# Patient Record
Sex: FEMALE | Race: WHITE | Employment: UNEMPLOYED | URBAN - METROPOLITAN AREA
[De-identification: names, ages, dates, MRNs, and addresses within clinical notes are randomized per-mention and may not be internally consistent; named-entity substitution may affect disease eponyms.]

---

## 2018-06-24 ENCOUNTER — APPOINTMENT (OUTPATIENT)
Dept: GENERAL RADIOLOGY | Age: 23
End: 2018-06-24
Payer: COMMERCIAL

## 2018-06-24 ENCOUNTER — HOSPITAL ENCOUNTER (EMERGENCY)
Age: 23
Discharge: HOME | End: 2018-06-24
Payer: COMMERCIAL

## 2018-06-24 VITALS
TEMPERATURE: 98.6 F | SYSTOLIC BLOOD PRESSURE: 112 MMHG | HEART RATE: 84 BPM | BODY MASS INDEX: 31.39 KG/M2 | WEIGHT: 200 LBS | RESPIRATION RATE: 16 BRPM | HEIGHT: 67 IN | DIASTOLIC BLOOD PRESSURE: 72 MMHG | OXYGEN SATURATION: 98 %

## 2018-06-24 DIAGNOSIS — S93.401A SPRAIN OF RIGHT ANKLE, UNSPECIFIED LIGAMENT, INITIAL ENCOUNTER: ICD-10-CM

## 2018-06-24 DIAGNOSIS — S90.31XA CONTUSION OF RIGHT FOOT, INITIAL ENCOUNTER: ICD-10-CM

## 2018-06-24 DIAGNOSIS — S92.512A CLOSED DISPLACED FRACTURE OF PROXIMAL PHALANX OF LESSER TOE OF LEFT FOOT, INITIAL ENCOUNTER: Primary | ICD-10-CM

## 2018-06-24 PROCEDURE — 73630 X-RAY EXAM OF FOOT: CPT

## 2018-06-24 PROCEDURE — 73610 X-RAY EXAM OF ANKLE: CPT

## 2018-06-24 PROCEDURE — 6370000000 HC RX 637 (ALT 250 FOR IP): Performed by: NURSE PRACTITIONER

## 2018-06-24 PROCEDURE — 73660 X-RAY EXAM OF TOE(S): CPT

## 2018-06-24 PROCEDURE — 28515 TREATMENT OF TOE FRACTURE: CPT

## 2018-06-24 PROCEDURE — 99284 EMERGENCY DEPT VISIT MOD MDM: CPT

## 2018-06-24 PROCEDURE — 2500000003 HC RX 250 WO HCPCS: Performed by: NURSE PRACTITIONER

## 2018-06-24 RX ORDER — HYDROCODONE BITARTRATE AND ACETAMINOPHEN 5; 325 MG/1; MG/1
1 TABLET ORAL EVERY 6 HOURS PRN
Qty: 12 TABLET | Refills: 0 | Status: SHIPPED | OUTPATIENT
Start: 2018-06-24 | End: 2018-06-27

## 2018-06-24 RX ORDER — BUPIVACAINE HYDROCHLORIDE 2.5 MG/ML
10 INJECTION, SOLUTION EPIDURAL; INFILTRATION; INTRACAUDAL ONCE
Status: COMPLETED | OUTPATIENT
Start: 2018-06-24 | End: 2018-06-24

## 2018-06-24 RX ORDER — HYDROCODONE BITARTRATE AND ACETAMINOPHEN 5; 325 MG/1; MG/1
1 TABLET ORAL ONCE
Status: COMPLETED | OUTPATIENT
Start: 2018-06-24 | End: 2018-06-24

## 2018-06-24 RX ADMIN — BUPIVACAINE HYDROCHLORIDE 25 MG: 2.5 INJECTION, SOLUTION EPIDURAL; INFILTRATION; INTRACAUDAL at 15:54

## 2018-06-24 RX ADMIN — HYDROCODONE BITARTRATE AND ACETAMINOPHEN 1 TABLET: 5; 325 TABLET ORAL at 15:01

## 2018-06-24 ASSESSMENT — PAIN SCALES - GENERAL
PAINLEVEL_OUTOF10: 9
PAINLEVEL_OUTOF10: 9

## 2018-06-24 ASSESSMENT — PAIN DESCRIPTION - LOCATION: LOCATION: FOOT

## 2018-06-24 ASSESSMENT — PAIN DESCRIPTION - ORIENTATION: ORIENTATION: RIGHT;LEFT

## 2018-06-24 ASSESSMENT — PAIN DESCRIPTION - DESCRIPTORS: DESCRIPTORS: THROBBING;ACHING

## 2018-06-24 ASSESSMENT — PAIN DESCRIPTION - PAIN TYPE: TYPE: ACUTE PAIN

## 2018-06-24 ASSESSMENT — PAIN DESCRIPTION - FREQUENCY: FREQUENCY: CONTINUOUS

## 2018-06-24 ASSESSMENT — PAIN DESCRIPTION - PROGRESSION: CLINICAL_PROGRESSION: GRADUALLY WORSENING

## 2018-06-24 NOTE — ED NOTES
Left foot, toes 2-4 hermilo tapped together, post op shoe applied to left foot. CMP's intact before and after application. Right foot air cast and post op shoe applied, CMP's intact before and after application.        Katherine Obrien RN  06/24/18 8002

## 2018-06-24 NOTE — ED NOTES
Discharge instructions reviewed with pt including diagnosis, new medication, follow up appointments, and S/S of when to return.   Pt verbalized understanding      Brittany Hernandez RN  06/24/18 1687

## 2018-06-24 NOTE — ED PROVIDER NOTES
[facility]       Department of Emergency Medicine   ED  Provider Note  Admit Date/Time: 6/24/2018  2:20 PM  ED Bed: 15/15  MRN: [id number]  Chief Complaint: Foot Pain (Bilateral foot pain starting yesterday. Pt fell yesterday )       History of Present Illness   Source of history provided by:  patient and spouse/SO. History/Exam Limitations: none. Paula Schulz is a 26 y.o. female who has a past medical history of: History reviewed. No pertinent past medical history. presents to the ED by private car and is accompanied by spouse for bilateral foot and ankle pain, beginning 1 day  ago and are gradually worsening since that time. The complaint has been persistent, moderate in severity. She states she slipped on a wet step and bent the toes back on her left foot and twisted her right foot/ankle. She states she tried to get up and slipped again with the same results. She states that she has tried motrin with no relief from pain. She is unable to bear weight. Tetanus within last 5 years. Denies other injuries. ROS    Pertinent positives and negatives are stated within HPI, all other systems reviewed and are negative. Past Surgical History:   Procedure Laterality Date    TONSILLECTOMY AND ADENOIDECTOMY     Social History:  reports that she has been smoking. She has been smoking about 0.25 packs per day. She has never used smokeless tobacco. She reports that she does not drink alcohol or use drugs. Family History: family history is not on file. Allergies: Patient has no known allergies.     Physical Exam   Oxygen Saturation Interpretation: Normal.   ED Triage Vitals   BP Temp Temp Source Pulse Resp SpO2 Height Weight   06/24/18 1426 06/24/18 1426 06/24/18 1426 06/24/18 1426 06/24/18 1426 06/24/18 1426 06/24/18 1430 06/24/18 1430   128/72 98.6 °F (37 °C) Oral 71 16 100 % 5' 7\" (1.702 m) 200 lb (90.7 kg)       Physical Exam     · Constitutional/General: Alert and oriented x3, well appearing, non toxic  · HEENT:  NC/NT. PERRLA,  Airway patent. · Neck: Supple, full ROM, non tender to palpation in the midline, no stridor, no crepitus, no meningeal signs  · Respiratory: Lungs clear to auscultation bilaterally, no wheezes, rales, or rhonchi. Not in respiratory distress  · CV:  Regular rate. Regular rhythm. No murmurs, gallops, or rubs. 2+ distal pulses  · Chest: No chest wall tenderness  · GI:  Abdomen Soft, Non tender, Non distended. +BS. No rebound, guarding, or rigidity. No pulsatile masses. · Musculoskeletal: Right lateral ankle and lateral  with mild edema, tenderness and some ecchymosis. Left ankle with minimal lateral tenderness. Left foot with moderate tenderness, ecchymosis and edema at the base of toes 2 ,3, and 4. Small abrasion to medial aspect of the fifth digit. Mild deformity to 4th digit of left toe. MSPs intact bilaterally. Knees WNL bilaterally. Gait is with a limp Capillary refill <3 seconds  · Integument: skin warm and dry. No rashes unless otherwise noted. .   · Lymphatic: no lymphadenopathy noted  · Neurologic: GCS 15, no focal deficits, symmetric strength 5/5 in the upper and lower extremities bilaterally  · Psychiatric: Normal Affect    Lab / Imaging Results   (All laboratory and radiology results have been personally reviewed by myself)  Labs:  No results found for this visit on 06/24/18. Imaging: All Radiology results interpreted by Radiologist unless otherwise noted. XR TOE LEFT (MIN 2 VIEWS)   Final Result   Post reduction with improved alignment fourth proximal phalanx   fracture. XR FOOT LEFT (MIN 3 VIEWS)   Final Result   Fracture involving the base of the proximal phalanx of the fourth   digit. XR ANKLE RIGHT (MIN 3 VIEWS)   Final Result   No fracture or dislocation. XR ANKLE LEFT (MIN 3 VIEWS)   Final Result   No fracture or dislocation. XR FOOT RIGHT (MIN 3 VIEWS)   Final Result   No fracture or dislocation. ED Course / Medical Decision Making     Medications   HYDROcodone-acetaminophen (NORCO) 5-325 MG per tablet 1 tablet (1 tablet Oral Given 6/24/18 1501)   bupivacaine (PF) (MARCAINE) 0.25 % injection 25 mg (25 mg Intradermal Given 6/24/18 1554)     ED Course as of Jun 26 2209   Sun Jun 24, 2018   1547 Digital block initiated. Verbal consent obtained after discussing risks and benefits. [JG]   1634 Attempted reduction of left 4th toe after obtaining verbal consent . Risks and benefits were discussed. [JG]      ED Course User Index  [JG] KARLA Johnson CNP         Consult(s):   None    Procedure(s):     PROCEDURE NOTE    6/24/18       Time: 6526    JOINT  REDUCTION  PROCEDURE  Risks, benefits and alternatives (for applicable procedures below) described. Performed By: KARLA Yang CNP. Indication:   fracture  Informed consent: The patient provided verbal consent for this procedure. .  Location:   left  4th toe PIP joint  Procedure:  Anesthsetic/anesthsia was obtained using a digital block of the left fourth toe using 0.5% Bupivacaine without epinephrine. Attempted reduction was performed by direct traction. Patient tolerated the procedure well. Post-reduction XR's:  were obtained and revealed partial but satisfactory reduction. Orthopaedic Consultation:  No.           MDM:   Patient presents with bilateral foot and ankle pain after a fall. No other injuries. No anticoagulation. Difficulty bearing weight more difficult to put weight on left. Imaging reveals closed displaced fracture of the fourth digit. Digital block placed and reduction of the fracture completed. Some improvement of alignment is achieved. Plan is for podiatry follow up, immobilization and prescribed pain medication. Patient is to return for new or worsening symptoms. Counseling:     The emergency provider has spoken with the patient and spouse/SO and discussed todays results, in addition to providing specific details for the plan of care and counseling regarding the diagnosis and prognosis. Questions are answered at this time and they are agreeable with the plan. Assessment      1. Closed displaced fracture of proximal phalanx of lesser toe of left foot, initial encounter    2. Contusion of right foot, initial encounter    3. Sprain of right ankle, unspecified ligament, initial encounter      Plan   Discharge to home  Patient condition is stable    New Medications     Discharge Medication List as of 6/24/2018  5:04 PM      START taking these medications    Details   HYDROcodone-acetaminophen (NORCO) 5-325 MG per tablet Take 1 tablet by mouth every 6 hours as needed for Pain for up to 3 days. ., Disp-12 tablet, R-0Print           Electronically signed by KALRA Ferrell CNP   DD: 6/24/18  **This report was transcribed using voice recognition software. Every effort was made to ensure accuracy; however, inadvertent computerized transcription errors may be present.   END OF ED PROVIDER NOTE         KARLA Alejandra CNP  06/26/18 9806

## 2021-06-21 ENCOUNTER — HOSPITAL ENCOUNTER (EMERGENCY)
Age: 26
Discharge: HOME | End: 2021-06-22
Attending: EMERGENCY MEDICINE
Payer: MEDICAID

## 2021-06-21 DIAGNOSIS — J01.00 ACUTE MAXILLARY SINUSITIS, RECURRENCE NOT SPECIFIED: ICD-10-CM

## 2021-06-21 DIAGNOSIS — R51.9 SINUS HEADACHE: Primary | ICD-10-CM

## 2021-06-21 PROCEDURE — 96375 TX/PRO/DX INJ NEW DRUG ADDON: CPT

## 2021-06-21 PROCEDURE — 84703 CHORIONIC GONADOTROPIN ASSAY: CPT

## 2021-06-21 PROCEDURE — 2580000003 HC RX 258: Performed by: EMERGENCY MEDICINE

## 2021-06-21 PROCEDURE — 6360000002 HC RX W HCPCS: Performed by: EMERGENCY MEDICINE

## 2021-06-21 PROCEDURE — 36415 COLL VENOUS BLD VENIPUNCTURE: CPT

## 2021-06-21 PROCEDURE — 99283 EMERGENCY DEPT VISIT LOW MDM: CPT

## 2021-06-21 PROCEDURE — 96374 THER/PROPH/DIAG INJ IV PUSH: CPT

## 2021-06-21 RX ORDER — 0.9 % SODIUM CHLORIDE 0.9 %
2000 INTRAVENOUS SOLUTION INTRAVENOUS ONCE
Status: COMPLETED | OUTPATIENT
Start: 2021-06-21 | End: 2021-06-22

## 2021-06-21 RX ORDER — LORAZEPAM 2 MG/ML
1 INJECTION INTRAMUSCULAR ONCE
Status: COMPLETED | OUTPATIENT
Start: 2021-06-21 | End: 2021-06-21

## 2021-06-21 RX ORDER — METOCLOPRAMIDE HYDROCHLORIDE 5 MG/ML
10 INJECTION INTRAMUSCULAR; INTRAVENOUS ONCE
Status: COMPLETED | OUTPATIENT
Start: 2021-06-21 | End: 2021-06-21

## 2021-06-21 RX ORDER — DIPHENHYDRAMINE HYDROCHLORIDE 50 MG/ML
50 INJECTION INTRAMUSCULAR; INTRAVENOUS ONCE
Status: COMPLETED | OUTPATIENT
Start: 2021-06-21 | End: 2021-06-21

## 2021-06-21 RX ADMIN — METOCLOPRAMIDE HYDROCHLORIDE 10 MG: 5 INJECTION INTRAMUSCULAR; INTRAVENOUS at 23:58

## 2021-06-21 RX ADMIN — SODIUM CHLORIDE 2000 ML: 9 INJECTION, SOLUTION INTRAVENOUS at 23:51

## 2021-06-21 RX ADMIN — DIPHENHYDRAMINE HYDROCHLORIDE 50 MG: 50 INJECTION INTRAMUSCULAR; INTRAVENOUS at 23:58

## 2021-06-21 RX ADMIN — LORAZEPAM 1 MG: 2 INJECTION INTRAMUSCULAR; INTRAVENOUS at 23:58

## 2021-06-21 ASSESSMENT — PAIN DESCRIPTION - PAIN TYPE: TYPE: ACUTE PAIN

## 2021-06-21 ASSESSMENT — PAIN DESCRIPTION - ONSET: ONSET: GRADUAL

## 2021-06-21 ASSESSMENT — PAIN DESCRIPTION - ORIENTATION: ORIENTATION: RIGHT

## 2021-06-21 ASSESSMENT — PAIN DESCRIPTION - DESCRIPTORS: DESCRIPTORS: PRESSURE;THROBBING

## 2021-06-21 ASSESSMENT — PAIN DESCRIPTION - PROGRESSION: CLINICAL_PROGRESSION: GRADUALLY WORSENING

## 2021-06-21 ASSESSMENT — PAIN SCALES - WONG BAKER: WONGBAKER_NUMERICALRESPONSE: 0

## 2021-06-21 ASSESSMENT — PAIN DESCRIPTION - LOCATION: LOCATION: HEAD

## 2021-06-21 ASSESSMENT — PAIN SCALES - GENERAL: PAINLEVEL_OUTOF10: 8

## 2021-06-21 ASSESSMENT — PAIN DESCRIPTION - FREQUENCY: FREQUENCY: INTERMITTENT

## 2021-06-22 ENCOUNTER — APPOINTMENT (OUTPATIENT)
Dept: CT IMAGING | Age: 26
End: 2021-06-22
Payer: MEDICAID

## 2021-06-22 VITALS
DIASTOLIC BLOOD PRESSURE: 79 MMHG | TEMPERATURE: 98.4 F | HEART RATE: 81 BPM | SYSTOLIC BLOOD PRESSURE: 141 MMHG | WEIGHT: 210 LBS | HEIGHT: 65 IN | BODY MASS INDEX: 34.99 KG/M2 | RESPIRATION RATE: 16 BRPM | OXYGEN SATURATION: 100 %

## 2021-06-22 LAB — HCG QUALITATIVE: NEGATIVE

## 2021-06-22 PROCEDURE — 6360000002 HC RX W HCPCS: Performed by: EMERGENCY MEDICINE

## 2021-06-22 PROCEDURE — 96375 TX/PRO/DX INJ NEW DRUG ADDON: CPT

## 2021-06-22 PROCEDURE — 70450 CT HEAD/BRAIN W/O DYE: CPT

## 2021-06-22 PROCEDURE — 2580000003 HC RX 258: Performed by: EMERGENCY MEDICINE

## 2021-06-22 RX ORDER — LORATADINE AND PSEUDOEPHEDRINE SULFATE 5; 120 MG/1; MG/1
1 TABLET, EXTENDED RELEASE ORAL 2 TIMES DAILY
Qty: 20 TABLET | Refills: 0 | Status: SHIPPED | OUTPATIENT
Start: 2021-06-22 | End: 2022-02-09 | Stop reason: ALTCHOICE

## 2021-06-22 RX ORDER — ONDANSETRON 4 MG/1
4 TABLET, ORALLY DISINTEGRATING ORAL EVERY 8 HOURS PRN
Qty: 20 TABLET | Refills: 0 | Status: SHIPPED | OUTPATIENT
Start: 2021-06-22

## 2021-06-22 RX ORDER — DOXYCYCLINE HYCLATE 100 MG
100 TABLET ORAL 2 TIMES DAILY
Qty: 20 TABLET | Refills: 0 | Status: SHIPPED | OUTPATIENT
Start: 2021-06-22 | End: 2021-07-02

## 2021-06-22 RX ORDER — BUTALBITAL, ACETAMINOPHEN AND CAFFEINE 300; 40; 50 MG/1; MG/1; MG/1
1 CAPSULE ORAL EVERY 4 HOURS PRN
Qty: 20 CAPSULE | Refills: 0 | Status: SHIPPED | OUTPATIENT
Start: 2021-06-22

## 2021-06-22 RX ADMIN — CEFTRIAXONE 1000 MG: 1 INJECTION, POWDER, FOR SOLUTION INTRAMUSCULAR; INTRAVENOUS at 01:07

## 2021-06-22 NOTE — ED PROVIDER NOTES
HPI:  6/22/21, Time: 12:40 AM EDT        David Andino is a 27 y.o. female presenting to the ED for sinus congestion, headache plus nausea w/ vomiting, beginning 4 days ago. The complaint has been persistent, severe in severity, and worsened by vomiting. Patient has not had any fever/chills associated. She states she does smoke cigarettes. She has had some nosebleeds occasionally after vomiting. She is not had any focal extremity weakness, no slurred speech no vision loss, no neck stiffness but she has had some right, lateral neck pain, no rashes. Patient denies any change in bowel bladder habit patterns reported. No other complaints are reported. She rates her headache at 8/10 severity. Review of Systems:   A complete review of systems was performed and pertinent positives and negatives are stated within HPI, all other systems reviewed and are negative.    --------------------------------------------- PAST HISTORY ---------------------------------------------  Past Medical History:  has no past medical history on file. Past Surgical History:  has a past surgical history that includes Tonsillectomy and adenoidectomy. Social History:  reports that she has been smoking cigarettes. She has been smoking about 0.25 packs per day. She has never used smokeless tobacco. She reports that she does not drink alcohol and does not use drugs. Family History: family history is not on file. The patients home medications have been reviewed. Allergies: Patient has no known allergies.     -------------------------------------------------- RESULTS -------------------------------------------------  All laboratory and radiology results have been personally reviewed by myself   LABS:  Results for orders placed or performed during the hospital encounter of 06/21/21   HCG Qualitative, Serum   Result Value Ref Range    hCG Qual NEGATIVE NEGATIVE       RADIOLOGY:  Interpreted by Radiologist.  [address] Final Result   No acute intracranial abnormality. No acute territorial infarction,   intracranial hemorrhage or mass lesion. Moderate mucosal thickening of the ethmoidal air cells and maxillary sinuses   and left sphenoid sinus. Mild mucosal thickening of right sphenoid sinus.             ------------------------- NURSING NOTES AND VITALS REVIEWED ---------------------------    The nursing notes within the ED encounter and vital signs as below have been reviewed. BP (!) 164/84   Pulse 85   Temp 98.4 °F (36.9 °C) (Oral)   Resp 16   Ht 5' 5\" (1.651 m)   Wt 210 lb (95.3 kg)   LMP 06/08/2021 (Exact Date)   SpO2 99%   Breastfeeding Unknown   BMI 34.95 kg/m²   Oxygen Saturation Interpretation: Normal      ---------------------------------------------------PHYSICAL EXAM--------------------------------------      Constitutional/General: Alert and oriented x3, well appearing, non toxic in moderate distress  Head: Normocephalic and atraumatic  Eyes: PERRL, EOMI   Ears: Grossly normal with no evidence of any trauma. Mouth: Oropharynx clear, handling secretions, no trismus  Neck: Supple, full ROM, no nuchal rigidity, no JVD. Trachea midline no lymphadenopathy  Pulmonary: Lungs clear to auscultation bilaterally, no wheezes, rales, or rhonchi. Not in respiratory distress  Cardiovascular:  Regular rate and rhythm, no murmurs, gallops, or rubs. 2+ distal pulses  GI:  Abdomen Soft, non tender, non distended, no organomegaly no masses no guarding no rigidity. Normal active bowel sounds  Extremities: Moves all extremities x 4. Warm and well perfused  Skin: warm and dry without rash; cervically no petechia no purpura no target lesions no bullae  Neurologic: GCS 15, cranial nerves II through XII intact no focal deficits.   No meningeal signs  Psych: Normal Affect      ------------------------------ ED COURSE/MEDICAL DECISION MAKING----------------------  Medications   cefTRIAXone (ROCEPHIN) 1,000 mg in sterile water 10 mL IV syringe (has no administration in time range)   diphenhydrAMINE (BENADRYL) injection 50 mg (50 mg Intravenous Given 6/21/21 2358)   metoclopramide (REGLAN) injection 10 mg (10 mg Intravenous Given 6/21/21 2358)   LORazepam (ATIVAN) injection 1 mg (1 mg Intravenous Given 6/21/21 2358)   0.9 % sodium chloride bolus (2,000 mLs Intravenous New Bag 6/21/21 2351)         ED COURSE:     Medical Decision Making:   Differential diagnoses:  Sinusitis/sinus headache, common migraine headache, classic migraine headache, muscular tension headache, SAH, meningitis, to name a few. Patient does not have any photophobia no nuchal rigidity nor febrile course to suggest meningitis. No meningeal signs at all on clinical exam.  Her headache is not described as worst headache of her life although severe in a CT head study showed no acute process. No further studies are felt to be warranted based upon clinical history. Patient is felt to have likely sinusitis with sinus headache plus possible contributing muscular tension headache. She is given Rocephin 1 g IV here in the department for treatment of suspected sinusitis and the patient is also given appropriate analgesic meds for her headache discharged home. Patient was also counseled regarding quitting smoking. Counseling: The emergency provider has spoken with the patient and spouse/SO and discussed todays results, in addition to providing specific details for the plan of care and counseling regarding the diagnosis and prognosis. Questions are answered at this time and they are agreeable with the plan.    --------------------------------- IMPRESSION AND DISPOSITION ---------------------------------    IMPRESSION  1. Sinus headache    2. Acute maxillary sinusitis, recurrence not specified        DISPOSITION  Disposition: Discharge to home  Patient condition is stable      NOTE: This report was transcribed using voice recognition software.  Every effort was made to ensure accuracy; however, inadvertent computerized transcription errors may be present        Steven Harry MD  06/22/21 0055

## 2022-02-08 ENCOUNTER — APPOINTMENT (OUTPATIENT)
Dept: GENERAL RADIOLOGY | Age: 27
End: 2022-02-08
Payer: MEDICAID

## 2022-02-08 ENCOUNTER — HOSPITAL ENCOUNTER (EMERGENCY)
Age: 27
Discharge: HOME | End: 2022-02-09
Attending: EMERGENCY MEDICINE
Payer: MEDICAID

## 2022-02-08 DIAGNOSIS — J45.20 MILD INTERMITTENT REACTIVE AIRWAY DISEASE WITHOUT COMPLICATION: Primary | ICD-10-CM

## 2022-02-08 PROCEDURE — 87635 SARS-COV-2 COVID-19 AMP PRB: CPT

## 2022-02-08 PROCEDURE — 71046 X-RAY EXAM CHEST 2 VIEWS: CPT

## 2022-02-08 PROCEDURE — 99284 EMERGENCY DEPT VISIT MOD MDM: CPT

## 2022-02-08 PROCEDURE — 6370000000 HC RX 637 (ALT 250 FOR IP): Performed by: EMERGENCY MEDICINE

## 2022-02-08 RX ORDER — IPRATROPIUM BROMIDE AND ALBUTEROL SULFATE 2.5; .5 MG/3ML; MG/3ML
1 SOLUTION RESPIRATORY (INHALATION) ONCE
Status: COMPLETED | OUTPATIENT
Start: 2022-02-08 | End: 2022-02-08

## 2022-02-08 RX ORDER — PREDNISONE 20 MG/1
60 TABLET ORAL ONCE
Status: COMPLETED | OUTPATIENT
Start: 2022-02-08 | End: 2022-02-08

## 2022-02-08 RX ADMIN — IPRATROPIUM BROMIDE AND ALBUTEROL SULFATE 1 AMPULE: 2.5; .5 SOLUTION RESPIRATORY (INHALATION) at 23:38

## 2022-02-08 RX ADMIN — PREDNISONE 60 MG: 20 TABLET ORAL at 23:37

## 2022-02-09 VITALS
HEART RATE: 94 BPM | RESPIRATION RATE: 20 BRPM | WEIGHT: 200 LBS | SYSTOLIC BLOOD PRESSURE: 164 MMHG | BODY MASS INDEX: 33.28 KG/M2 | TEMPERATURE: 98.3 F | DIASTOLIC BLOOD PRESSURE: 89 MMHG | OXYGEN SATURATION: 94 %

## 2022-02-09 LAB — SARS-COV-2, NAAT: NOT DETECTED

## 2022-02-09 RX ORDER — PREDNISONE 20 MG/1
20 TABLET ORAL 2 TIMES DAILY
Qty: 10 TABLET | Refills: 0 | Status: SHIPPED | OUTPATIENT
Start: 2022-02-09 | End: 2022-02-14

## 2022-02-09 RX ORDER — ALBUTEROL SULFATE 90 UG/1
2 AEROSOL, METERED RESPIRATORY (INHALATION) EVERY 4 HOURS PRN
Qty: 18 G | Refills: 0 | Status: SHIPPED | OUTPATIENT
Start: 2022-02-09

## 2022-02-09 RX ORDER — LORATADINE AND PSEUDOEPHEDRINE SULFATE 5; 120 MG/1; MG/1
1 TABLET, EXTENDED RELEASE ORAL 2 TIMES DAILY
Qty: 20 TABLET | Refills: 0 | Status: SHIPPED | OUTPATIENT
Start: 2022-02-09

## 2022-02-09 NOTE — ED PROVIDER NOTES
HPI:  2/8/22, Time: 11:20 PM APRYL Alicia is a 24 y.o. female presenting to the ED for shortness of breath, beginning 12 hrs ago. The complaint has been persistent, moderate in severity, and worsened by moderate exertion and while laying down. Patient denies any chest pain/pressure/tightness associated. Patient is not on any oral estrogen medications/birth control and she denies any fever/chills associated. Patient reportedly quit smoking 1 month ago but does have a 10-pack-year history of tobacco use. No relieving factors are reported. No other complaints; no nausea/vomiting, no diaphoresis, no palpitations, no syncope nor near syncopal episodes reported. Patient is unvaccinated against COVID-19    Review of Systems:   A complete review of systems was performed and pertinent positives and negatives are stated within HPI, all other systems reviewed and are negative.    --------------------------------------------- PAST HISTORY ---------------------------------------------  Past Medical History:  has no past medical history on file. Past Surgical History:  has a past surgical history that includes Tonsillectomy and adenoidectomy. Social History:  reports that she quit smoking about 4 weeks ago. Her smoking use included cigarettes. She started smoking about 19 years ago. She smoked 0.25 packs per day. She has never used smokeless tobacco. She reports that she does not drink alcohol and does not use drugs. Family History: family history is not on file. The patients home medications have been reviewed. Allergies: Patient has no known allergies.     -------------------------------------------------- RESULTS -------------------------------------------------  All laboratory and radiology results have been personally reviewed by myself   LABS:  Results for orders placed or performed during the hospital encounter of 02/08/22   COVID-19, Rapid    Specimen: Nasopharyngeal Swab   Result Value Ref Range    SARS-CoV-2, NAAT Not Detected Not Detected       RADIOLOGY:  Interpreted by Radiologist.  XR CHEST (2 VW)   Final Result   No acute process. ------------------------- NURSING NOTES AND VITALS REVIEWED ---------------------------    The nursing notes within the ED encounter and vital signs as below have been reviewed. BP (!) 164/89   Pulse 94   Temp 98.3 °F (36.8 °C) (Oral)   Resp 20   Wt 200 lb (90.7 kg)   LMP 01/08/2022 (Approximate)   SpO2 94%   BMI 33.28 kg/m²   Oxygen Saturation Interpretation: Normal      ---------------------------------------------------PHYSICAL EXAM--------------------------------------      Constitutional/General: Alert and oriented x3, well appearing, non toxic in mild distress  Head: Normocephalic and atraumatic  Eyes: PERRL, EOMI  Mouth: Oropharynx clear, handling secretions, no trismus  Neck: Supple, full ROM, no JVD. Trachea  Pulmonary: Lungs clear to auscultation bilaterally, no wheezes, rales, or rhonchi. Not in respiratory distress  Cardiovascular:  Regular rate and rhythm, no murmurs, gallops, or rubs. 2+ distal pulses  Abdomen: Soft, non tender, non distended, organomegaly no masses no guarding no rigidity normal bowel  Extremities: Moves all extremities x 4. Warm and well perfused  Skin: warm and dry without rash  Neurologic: GCS 15, renal nerves grossly intact no focal deficits  Psych: Normal Affect      ------------------------------ ED COURSE/MEDICAL DECISION MAKING----------------------  Medications   predniSONE (DELTASONE) tablet 60 mg (60 mg Oral Given 2/8/22 2337)   ipratropium-albuterol (DUONEB) nebulizer solution 1 ampule (1 ampule Inhalation Given 2/8/22 2338)         ED COURSE:     Medical Decision Making:   Differential diagnoses:  Asthma, reactive airway disease, pneumonia, PE, COVID-19, to name a few.   Patient did receive a DuoNeb nebulized aerosol treatment here in the department she states her symptoms are significant improved. Her COVID-19 screen came back and was negative and chest x-ray showed no evidence of cardiomegaly, no CHF, no focal infiltrates. Patient's mother is stable appropriate discharge close outpatient follow-up. Pt's Well's Score for PE = 0 points, low risk score; PERC--Negative. Counseling: The emergency provider has spoken with the patient and discussed todays results, in addition to providing specific details for the plan of care and counseling regarding the diagnosis and prognosis. Questions are answered at this time and they are agreeable with the plan.    --------------------------------- IMPRESSION AND DISPOSITION ---------------------------------    IMPRESSION  1. Mild intermittent reactive airway disease without complication        DISPOSITION  Disposition: Discharge to home  Patient condition is stable      NOTE: This report was transcribed using voice recognition software.  Every effort was made to ensure accuracy; however, inadvertent computerized transcription errors may be present        Rosamaria Padilla MD  02/09/22 8115

## 2022-02-09 NOTE — ED NOTES
Discharged   rx x 3 escribed to [facility]  [place]-   To follow with pmd     Ramon Cody RN  02/09/22 6564

## 2022-03-13 ENCOUNTER — APPOINTMENT (OUTPATIENT)
Dept: GENERAL RADIOLOGY | Age: 27
End: 2022-03-13
Payer: MEDICAID

## 2022-03-13 ENCOUNTER — HOSPITAL ENCOUNTER (EMERGENCY)
Age: 27
Discharge: HOME | End: 2022-03-13
Attending: EMERGENCY MEDICINE
Payer: MEDICAID

## 2022-03-13 VITALS
RESPIRATION RATE: 16 BRPM | TEMPERATURE: 98.5 F | OXYGEN SATURATION: 91 % | WEIGHT: 200 LBS | BODY MASS INDEX: 33.28 KG/M2 | DIASTOLIC BLOOD PRESSURE: 94 MMHG | SYSTOLIC BLOOD PRESSURE: 128 MMHG | HEART RATE: 106 BPM

## 2022-03-13 DIAGNOSIS — J20.9 ACUTE BRONCHITIS WITH WHEEZING: ICD-10-CM

## 2022-03-13 DIAGNOSIS — J45.41 MODERATE PERSISTENT REACTIVE AIRWAY DISEASE WITH ACUTE EXACERBATION: Primary | ICD-10-CM

## 2022-03-13 LAB
ALBUMIN SERPL-MCNC: 4.5 G/DL (ref 3.5–5.2)
ALP BLD-CCNC: 133 U/L (ref 35–104)
ALT SERPL-CCNC: 29 U/L (ref 0–32)
ANION GAP SERPL CALCULATED.3IONS-SCNC: 13 MMOL/L (ref 7–16)
AST SERPL-CCNC: 26 U/L (ref 0–31)
BASOPHILS ABSOLUTE: 0.09 E9/L (ref 0–0.2)
BASOPHILS RELATIVE PERCENT: 0.7 % (ref 0–2)
BILIRUB SERPL-MCNC: 0.3 MG/DL (ref 0–1.2)
BUN BLDV-MCNC: 8 MG/DL (ref 6–20)
CALCIUM SERPL-MCNC: 9.7 MG/DL (ref 8.6–10.2)
CHLORIDE BLD-SCNC: 101 MMOL/L (ref 98–107)
CO2: 23 MMOL/L (ref 22–29)
CREAT SERPL-MCNC: 0.6 MG/DL (ref 0.5–1)
EOSINOPHILS ABSOLUTE: 0.44 E9/L (ref 0.05–0.5)
EOSINOPHILS RELATIVE PERCENT: 3.4 % (ref 0–6)
GFR AFRICAN AMERICAN: >60
GFR NON-AFRICAN AMERICAN: >60 ML/MIN/1.73
GLUCOSE BLD-MCNC: 95 MG/DL (ref 74–99)
HCG, URINE, POC: NEGATIVE
HCT VFR BLD CALC: 47.5 % (ref 34–48)
HEMOGLOBIN: 15.7 G/DL (ref 11.5–15.5)
IMMATURE GRANULOCYTES #: 0.04 E9/L
IMMATURE GRANULOCYTES %: 0.3 % (ref 0–5)
INFLUENZA A BY PCR: NOT DETECTED
INFLUENZA B BY PCR: NOT DETECTED
LYMPHOCYTES ABSOLUTE: 2.18 E9/L (ref 1.5–4)
LYMPHOCYTES RELATIVE PERCENT: 16.7 % (ref 20–42)
Lab: NORMAL
MCH RBC QN AUTO: 30.4 PG (ref 26–35)
MCHC RBC AUTO-ENTMCNC: 33.1 % (ref 32–34.5)
MCV RBC AUTO: 92.1 FL (ref 80–99.9)
MONOCYTES ABSOLUTE: 0.71 E9/L (ref 0.1–0.95)
MONOCYTES RELATIVE PERCENT: 5.4 % (ref 2–12)
NEGATIVE QC PASS/FAIL: NORMAL
NEUTROPHILS ABSOLUTE: 9.58 E9/L (ref 1.8–7.3)
NEUTROPHILS RELATIVE PERCENT: 73.5 % (ref 43–80)
PDW BLD-RTO: 11.9 FL (ref 11.5–15)
PLATELET # BLD: 352 E9/L (ref 130–450)
PMV BLD AUTO: 10.6 FL (ref 7–12)
POSITIVE QC PASS/FAIL: NORMAL
POTASSIUM REFLEX MAGNESIUM: 4.6 MMOL/L (ref 3.5–5)
RBC # BLD: 5.16 E12/L (ref 3.5–5.5)
SARS-COV-2, NAAT: NOT DETECTED
SODIUM BLD-SCNC: 137 MMOL/L (ref 132–146)
TOTAL PROTEIN: 8.1 G/DL (ref 6.4–8.3)
TROPONIN, HIGH SENSITIVITY: <6 NG/L (ref 0–9)
WBC # BLD: 13 E9/L (ref 4.5–11.5)

## 2022-03-13 PROCEDURE — 87635 SARS-COV-2 COVID-19 AMP PRB: CPT

## 2022-03-13 PROCEDURE — 85025 COMPLETE CBC W/AUTO DIFF WBC: CPT

## 2022-03-13 PROCEDURE — 84484 ASSAY OF TROPONIN QUANT: CPT

## 2022-03-13 PROCEDURE — 6370000000 HC RX 637 (ALT 250 FOR IP): Performed by: STUDENT IN AN ORGANIZED HEALTH CARE EDUCATION/TRAINING PROGRAM

## 2022-03-13 PROCEDURE — 94640 AIRWAY INHALATION TREATMENT: CPT

## 2022-03-13 PROCEDURE — 87502 INFLUENZA DNA AMP PROBE: CPT

## 2022-03-13 PROCEDURE — 80053 COMPREHEN METABOLIC PANEL: CPT

## 2022-03-13 PROCEDURE — 71045 X-RAY EXAM CHEST 1 VIEW: CPT

## 2022-03-13 PROCEDURE — 94664 DEMO&/EVAL PT USE INHALER: CPT

## 2022-03-13 PROCEDURE — 36415 COLL VENOUS BLD VENIPUNCTURE: CPT

## 2022-03-13 PROCEDURE — 99285 EMERGENCY DEPT VISIT HI MDM: CPT

## 2022-03-13 PROCEDURE — 93005 ELECTROCARDIOGRAM TRACING: CPT | Performed by: PHYSICIAN ASSISTANT

## 2022-03-13 PROCEDURE — 96374 THER/PROPH/DIAG INJ IV PUSH: CPT

## 2022-03-13 PROCEDURE — 6360000002 HC RX W HCPCS: Performed by: STUDENT IN AN ORGANIZED HEALTH CARE EDUCATION/TRAINING PROGRAM

## 2022-03-13 RX ORDER — METHYLPREDNISOLONE SODIUM SUCCINATE 125 MG/2ML
125 INJECTION, POWDER, LYOPHILIZED, FOR SOLUTION INTRAMUSCULAR; INTRAVENOUS ONCE
Status: COMPLETED | OUTPATIENT
Start: 2022-03-13 | End: 2022-03-13

## 2022-03-13 RX ORDER — ALBUTEROL SULFATE 90 UG/1
2 AEROSOL, METERED RESPIRATORY (INHALATION) EVERY 6 HOURS PRN
Status: DISCONTINUED | OUTPATIENT
Start: 2022-03-13 | End: 2022-03-13 | Stop reason: CLARIF

## 2022-03-13 RX ORDER — ALBUTEROL SULFATE 90 UG/1
2 AEROSOL, METERED RESPIRATORY (INHALATION) EVERY 6 HOURS PRN
Status: DISCONTINUED | OUTPATIENT
Start: 2022-03-13 | End: 2022-03-14 | Stop reason: HOSPADM

## 2022-03-13 RX ORDER — PREDNISONE 50 MG/1
50 TABLET ORAL DAILY
Qty: 5 TABLET | Refills: 0 | Status: SHIPPED | OUTPATIENT
Start: 2022-03-13 | End: 2022-03-18

## 2022-03-13 RX ORDER — IPRATROPIUM BROMIDE AND ALBUTEROL SULFATE 2.5; .5 MG/3ML; MG/3ML
3 SOLUTION RESPIRATORY (INHALATION) ONCE
Status: COMPLETED | OUTPATIENT
Start: 2022-03-13 | End: 2022-03-13

## 2022-03-13 RX ADMIN — METHYLPREDNISOLONE SODIUM SUCCINATE 125 MG: 125 INJECTION, POWDER, FOR SOLUTION INTRAMUSCULAR; INTRAVENOUS at 22:02

## 2022-03-13 RX ADMIN — IPRATROPIUM BROMIDE AND ALBUTEROL SULFATE 3 AMPULE: .5; 3 SOLUTION RESPIRATORY (INHALATION) at 21:31

## 2022-03-13 RX ADMIN — ALBUTEROL SULFATE 2 PUFF: 90 AEROSOL, METERED RESPIRATORY (INHALATION) at 23:49

## 2022-03-13 ASSESSMENT — ENCOUNTER SYMPTOMS
SHORTNESS OF BREATH: 1
COUGH: 1
NAUSEA: 0
CHEST TIGHTNESS: 1
ABDOMINAL PAIN: 0
BACK PAIN: 1

## 2022-03-14 LAB
EKG ATRIAL RATE: 110 BPM
EKG P AXIS: 71 DEGREES
EKG P-R INTERVAL: 174 MS
EKG Q-T INTERVAL: 316 MS
EKG QRS DURATION: 86 MS
EKG QTC CALCULATION (BAZETT): 427 MS
EKG R AXIS: 67 DEGREES
EKG T AXIS: 60 DEGREES
EKG VENTRICULAR RATE: 110 BPM

## 2022-03-14 PROCEDURE — 93010 ELECTROCARDIOGRAM REPORT: CPT | Performed by: INTERNAL MEDICINE

## 2022-03-14 NOTE — ED PROVIDER NOTES
72-year-old female presenting for shortness of breath and cough. Symptoms started 3 hours prior to arrival.  They have been persistent, no exacerbating or relieving factors. Patient states that she has had a mild cough today. She states she has been seen in the ED before for this and was told she has allergies and was given Claritin. She complains of pain in her lower ribs and in her neck. She complains of a tightness in her chest.  She denies any fever, leg swelling, hemoptysis, recent surgery, leg swelling, recent long distance travel, and use of hormone based products. Review of Systems   Constitutional: Negative for chills and fever. HENT: Negative for congestion. Respiratory: Positive for cough, chest tightness and shortness of breath. Cardiovascular: Negative for chest pain and leg swelling. Gastrointestinal: Negative for abdominal pain and nausea. Musculoskeletal: Positive for back pain and neck pain. Skin: Negative for rash and wound. Neurological: Negative for dizziness and headaches. All other systems reviewed and are negative. Physical Exam  Constitutional:       Comments: Mild distress   HENT:      Head: Normocephalic and atraumatic. Eyes:      Pupils: Pupils are equal, round, and reactive to light. Cardiovascular:      Rate and Rhythm: Regular rhythm. Tachycardia present. Pulmonary:      Comments: Decreased aeration diffusely, diffuse expiratory wheezes tachypnea, conversational dyspnea  Abdominal:      Palpations: Abdomen is soft. Tenderness: There is no abdominal tenderness. Musculoskeletal:      Right lower leg: No edema. Left lower leg: No edema. Skin:     General: Skin is warm. Neurological:      General: No focal deficit present. Mental Status: She is alert.    Psychiatric:         Mood and Affect: Mood normal.         Behavior: Behavior normal.          Procedures     MDM  Number of Diagnoses or Management Options  Dyspnea, unspecified type  Diagnosis management comments: 22-year-old female presenting with shortness of breath and cough. Patient with wheezing and decreased aeration on exam, but not hypoxic. Labs and chest x-ray unremarkable. Patient was given duo nebs and Solu-Medrol with relief of symptoms. Patient is PERC negative, so low suspicion for PE at this time. Presentation consistent for exacerbation of reactive airway disease. Patient was given inhaler and prescription for prednisone. She was given referral to [facility] primary care. She was discharged home in stable condition with instructions to follow-up with PCP and to return to the ED if symptoms worsen. Amount and/or Complexity of Data Reviewed  Clinical lab tests: reviewed  Tests in the radiology section of CPT®: reviewed  Tests in the medicine section of CPT®: reviewed                      --------------------------------------------- PAST HISTORY ---------------------------------------------  Past Medical History:  has no past medical history on file. Past Surgical History:  has a past surgical history that includes Tonsillectomy and adenoidectomy. Social History:  reports that she quit smoking about 2 months ago. Her smoking use included cigarettes. She started smoking about 19 years ago. She smoked 0.25 packs per day. She has never used smokeless tobacco. She reports that she does not drink alcohol and does not use drugs. Family History: family history is not on file. The patients home medications have been reviewed. Allergies: Patient has no known allergies.     -------------------------------------------------- RESULTS -------------------------------------------------  Labs:  Results for orders placed or performed during the hospital encounter of 03/13/22   COVID-19, Rapid    Specimen: Nasopharyngeal Swab   Result Value Ref Range    SARS-CoV-2, NAAT Not Detected Not Detected   Rapid influenza A/B antigens    Specimen: Nasopharyngeal Result Value Ref Range    Influenza A by PCR Not Detected Not Detected    Influenza B by PCR Not Detected Not Detected   CBC with Auto Differential   Result Value Ref Range    WBC 13.0 (H) 4.5 - 11.5 E9/L    RBC 5.16 3.50 - 5.50 E12/L    Hemoglobin 15.7 (H) 11.5 - 15.5 g/dL    Hematocrit 47.5 34.0 - 48.0 %    MCV 92.1 80.0 - 99.9 fL    MCH 30.4 26.0 - 35.0 pg    MCHC 33.1 32.0 - 34.5 %    RDW 11.9 11.5 - 15.0 fL    Platelets 084 616 - 961 E9/L    MPV 10.6 7.0 - 12.0 fL    Neutrophils % 73.5 43.0 - 80.0 %    Immature Granulocytes % 0.3 0.0 - 5.0 %    Lymphocytes % 16.7 (L) 20.0 - 42.0 %    Monocytes % 5.4 2.0 - 12.0 %    Eosinophils % 3.4 0.0 - 6.0 %    Basophils % 0.7 0.0 - 2.0 %    Neutrophils Absolute 9.58 (H) 1.80 - 7.30 E9/L    Immature Granulocytes # 0.04 E9/L    Lymphocytes Absolute 2.18 1.50 - 4.00 E9/L    Monocytes Absolute 0.71 0.10 - 0.95 E9/L    Eosinophils Absolute 0.44 0.05 - 0.50 E9/L    Basophils Absolute 0.09 0.00 - 0.20 E9/L   Comprehensive Metabolic Panel w/ Reflex to MG   Result Value Ref Range    Sodium 137 132 - 146 mmol/L    Potassium reflex Magnesium 4.6 3.5 - 5.0 mmol/L    Chloride 101 98 - 107 mmol/L    CO2 23 22 - 29 mmol/L    Anion Gap 13 7 - 16 mmol/L    Glucose 95 74 - 99 mg/dL    BUN 8 6 - 20 mg/dL    CREATININE 0.6 0.5 - 1.0 mg/dL    GFR Non-African American >60 >=60 mL/min/1.73    GFR African American >60     Calcium 9.7 8.6 - 10.2 mg/dL    Total Protein 8.1 6.4 - 8.3 g/dL    Albumin 4.5 3.5 - 5.2 g/dL    Total Bilirubin 0.3 0.0 - 1.2 mg/dL    Alkaline Phosphatase 133 (H) 35 - 104 U/L    ALT 29 0 - 32 U/L    AST 26 0 - 31 U/L   Troponin   Result Value Ref Range    Troponin, High Sensitivity <6 0 - 9 ng/L   POC Pregnancy Urine Qual   Result Value Ref Range    HCG, Urine, POC Negative Negative    Lot Number [id number]     Positive QC Pass/Fail Pass     Negative QC Pass/Fail Pass    EKG 12 Lead   Result Value Ref Range    Ventricular Rate 110 BPM    Atrial Rate 110 BPM    P-R Interval 174 ms    QRS Duration 86 ms    Q-T Interval 316 ms    QTc Calculation (Bazett) 427 ms    P Axis 71 degrees    R Axis 67 degrees    T Axis 60 degrees       Radiology:  XR CHEST PORTABLE   Final Result        EKG: Interpreted by me  Rate: 110  Rhythm: Sinus  Interpretation: No acute ST elevations or depressions, no acute T wave changes, normal intervals  Comparison: No previous EKG available    ------------------------- NURSING NOTES AND VITALS REVIEWED ---------------------------  Date / Time Roomed:  3/13/2022  9:20 PM  ED Bed Assignment:  02/02    The nursing notes within the ED encounter and vital signs as below have been reviewed. BP (!) 128/94   Pulse 106   Temp 98.5 °F (36.9 °C) (Oral)   Resp 16   Wt 200 lb (90.7 kg)   LMP 03/08/2022   SpO2 91%   BMI 33.28 kg/m²   Oxygen Saturation Interpretation: Normal      ------------------------------------------ PROGRESS NOTES ------------------------------------------    I have spoken with the patient and discussed todays results, in addition to providing specific details for the plan of care and counseling regarding the diagnosis and prognosis. Their questions are answered at this time and they are agreeable with the plan. I discussed at length with them reasons for immediate return here for re evaluation. They will followup with their primary care physician by calling their office tomorrow. --------------------------------- ADDITIONAL PROVIDER NOTES ---------------------------------  At this time the patient is without objective evidence of an acute process requiring hospitalization or inpatient management. They have remained hemodynamically stable throughout their entire ED visit and are stable for discharge with outpatient follow-up. The plan has been discussed in detail and they are aware of the specific conditions for emergent return, as well as the importance of follow-up.       Discharge Medication List as of 3/13/2022 11:42 PM      START taking these medications    Details   predniSONE (DELTASONE) 50 MG tablet Take 1 tablet by mouth daily for 5 days, Disp-5 tablet, R-0Print             Diagnosis:  1. Dyspnea, unspecified type        Disposition:  Patient's disposition: Discharge to home  Patient's condition is stable.           Thais Robbins MD  Resident  03/14/22 [id number]

## 2023-02-26 ENCOUNTER — ANESTHESIA (OUTPATIENT)
Dept: LABOR AND DELIVERY | Age: 28
DRG: 560 | End: 2023-02-26
Payer: MEDICAID

## 2023-02-26 ENCOUNTER — HOSPITAL ENCOUNTER (INPATIENT)
Age: 28
LOS: 1 days | Discharge: HOME | DRG: 560 | End: 2023-02-27
Attending: OBSTETRICS & GYNECOLOGY | Admitting: OBSTETRICS & GYNECOLOGY
Payer: MEDICAID

## 2023-02-26 ENCOUNTER — ANESTHESIA EVENT (OUTPATIENT)
Dept: LABOR AND DELIVERY | Age: 28
DRG: 560 | End: 2023-02-26
Payer: MEDICAID

## 2023-02-26 PROBLEM — Z37.9 NORMAL LABOR: Status: ACTIVE | Noted: 2023-02-26

## 2023-02-26 LAB
ABO/RH: NORMAL
ALBUMIN SERPL-MCNC: 3.2 G/DL (ref 3.5–5.2)
ALP BLD-CCNC: 269 U/L (ref 35–104)
ALT SERPL-CCNC: 28 U/L (ref 0–32)
AMNISURE, POC: POSITIVE
AMPHETAMINE SCREEN, URINE: NOT DETECTED
ANION GAP SERPL CALCULATED.3IONS-SCNC: 10 MMOL/L (ref 7–16)
ANTIBODY SCREEN: NORMAL
AST SERPL-CCNC: 30 U/L (ref 0–31)
BARBITURATE SCREEN URINE: NOT DETECTED
BASOPHILS ABSOLUTE: 0.08 E9/L (ref 0–0.2)
BASOPHILS RELATIVE PERCENT: 0.7 % (ref 0–2)
BENZODIAZEPINE SCREEN, URINE: NOT DETECTED
BILIRUB SERPL-MCNC: 0.2 MG/DL (ref 0–1.2)
BUN BLDV-MCNC: 11 MG/DL (ref 6–20)
CALCIUM SERPL-MCNC: 8.8 MG/DL (ref 8.6–10.2)
CANNABINOID SCREEN URINE: NOT DETECTED
CHLORIDE BLD-SCNC: 107 MMOL/L (ref 98–107)
CO2: 20 MMOL/L (ref 22–29)
COCAINE METABOLITE SCREEN URINE: NOT DETECTED
CREAT SERPL-MCNC: 0.5 MG/DL (ref 0.5–1)
EOSINOPHILS ABSOLUTE: 0.27 E9/L (ref 0.05–0.5)
EOSINOPHILS RELATIVE PERCENT: 2.3 % (ref 0–6)
FENTANYL SCREEN, URINE: NOT DETECTED
GFR SERPL CREATININE-BSD FRML MDRD: >60 ML/MIN/1.73
GLUCOSE BLD-MCNC: 107 MG/DL (ref 74–99)
HCT VFR BLD CALC: 37.2 % (ref 34–48)
HEMOGLOBIN: 11.7 G/DL (ref 11.5–15.5)
IMMATURE GRANULOCYTES #: 0.04 E9/L
IMMATURE GRANULOCYTES %: 0.3 % (ref 0–5)
LYMPHOCYTES ABSOLUTE: 2.6 E9/L (ref 1.5–4)
LYMPHOCYTES RELATIVE PERCENT: 22.4 % (ref 20–42)
Lab: ABNORMAL
Lab: NORMAL
MCH RBC QN AUTO: 29.5 PG (ref 26–35)
MCHC RBC AUTO-ENTMCNC: 31.5 % (ref 32–34.5)
MCV RBC AUTO: 93.7 FL (ref 80–99.9)
METHADONE SCREEN, URINE: NOT DETECTED
MONOCYTES ABSOLUTE: 0.83 E9/L (ref 0.1–0.95)
MONOCYTES RELATIVE PERCENT: 7.1 % (ref 2–12)
NEGATIVE QC PASS/FAIL: ABNORMAL
NEUTROPHILS ABSOLUTE: 7.8 E9/L (ref 1.8–7.3)
NEUTROPHILS RELATIVE PERCENT: 67.2 % (ref 43–80)
OPIATE SCREEN URINE: NOT DETECTED
OXYCODONE URINE: NOT DETECTED
PDW BLD-RTO: 12.3 FL (ref 11.5–15)
PHENCYCLIDINE SCREEN URINE: NOT DETECTED
PLATELET # BLD: 342 E9/L (ref 130–450)
PMV BLD AUTO: 11.6 FL (ref 7–12)
POSITIVE QC PASS/FAIL: ABNORMAL
POTASSIUM SERPL-SCNC: 4.6 MMOL/L (ref 3.5–5)
RBC # BLD: 3.97 E12/L (ref 3.5–5.5)
SODIUM BLD-SCNC: 137 MMOL/L (ref 132–146)
TOTAL PROTEIN: 6.5 G/DL (ref 6.4–8.3)
WBC # BLD: 11.6 E9/L (ref 4.5–11.5)

## 2023-02-26 PROCEDURE — 1220000001 HC SEMI PRIVATE L&D R&B

## 2023-02-26 PROCEDURE — 2580000003 HC RX 258: Performed by: OBSTETRICS & GYNECOLOGY

## 2023-02-26 PROCEDURE — 85025 COMPLETE CBC W/AUTO DIFF WBC: CPT

## 2023-02-26 PROCEDURE — 2500000003 HC RX 250 WO HCPCS: Performed by: ANESTHESIOLOGY

## 2023-02-26 PROCEDURE — 87081 CULTURE SCREEN ONLY: CPT

## 2023-02-26 PROCEDURE — 86901 BLOOD TYPING SEROLOGIC RH(D): CPT

## 2023-02-26 PROCEDURE — 86850 RBC ANTIBODY SCREEN: CPT

## 2023-02-26 PROCEDURE — 6370000000 HC RX 637 (ALT 250 FOR IP): Performed by: ADVANCED PRACTICE MIDWIFE

## 2023-02-26 PROCEDURE — 87591 N.GONORRHOEAE DNA AMP PROB: CPT

## 2023-02-26 PROCEDURE — 80307 DRUG TEST PRSMV CHEM ANLYZR: CPT

## 2023-02-26 PROCEDURE — 87491 CHLMYD TRACH DNA AMP PROBE: CPT

## 2023-02-26 PROCEDURE — 3E033VJ INTRODUCTION OF OTHER HORMONE INTO PERIPHERAL VEIN, PERCUTANEOUS APPROACH: ICD-10-PCS | Performed by: ADVANCED PRACTICE MIDWIFE

## 2023-02-26 PROCEDURE — 6360000002 HC RX W HCPCS: Performed by: OBSTETRICS & GYNECOLOGY

## 2023-02-26 PROCEDURE — 80053 COMPREHEN METABOLIC PANEL: CPT

## 2023-02-26 PROCEDURE — 84112 EVAL AMNIOTIC FLUID PROTEIN: CPT

## 2023-02-26 PROCEDURE — 7200000001 HC VAGINAL DELIVERY

## 2023-02-26 PROCEDURE — 3700000025 EPIDURAL BLOCK: Performed by: ANESTHESIOLOGY

## 2023-02-26 PROCEDURE — 36415 COLL VENOUS BLD VENIPUNCTURE: CPT

## 2023-02-26 PROCEDURE — 86900 BLOOD TYPING SEROLOGIC ABO: CPT

## 2023-02-26 RX ORDER — EPHEDRINE SULFATE/0.9% NACL/PF 50 MG/5 ML
10 SYRINGE (ML) INTRAVENOUS ONCE
Status: COMPLETED | OUTPATIENT
Start: 2023-02-26 | End: 2023-02-26

## 2023-02-26 RX ORDER — MISOPROSTOL 200 UG/1
800 TABLET ORAL PRN
Status: DISCONTINUED | OUTPATIENT
Start: 2023-02-26 | End: 2023-02-27 | Stop reason: HOSPADM

## 2023-02-26 RX ORDER — SODIUM CHLORIDE 0.9 % (FLUSH) 0.9 %
5-40 SYRINGE (ML) INJECTION EVERY 12 HOURS SCHEDULED
Status: DISCONTINUED | OUTPATIENT
Start: 2023-02-26 | End: 2023-02-27 | Stop reason: HOSPADM

## 2023-02-26 RX ORDER — ACETAMINOPHEN 325 MG/1
650 TABLET ORAL EVERY 6 HOURS PRN
Status: DISCONTINUED | OUTPATIENT
Start: 2023-02-26 | End: 2023-02-27 | Stop reason: HOSPADM

## 2023-02-26 RX ORDER — NALBUPHINE HCL 10 MG/ML
10 AMPUL (ML) INJECTION
Status: DISCONTINUED | OUTPATIENT
Start: 2023-02-26 | End: 2023-02-27 | Stop reason: HOSPADM

## 2023-02-26 RX ORDER — ONDANSETRON 2 MG/ML
4 INJECTION INTRAMUSCULAR; INTRAVENOUS EVERY 6 HOURS PRN
Status: DISCONTINUED | OUTPATIENT
Start: 2023-02-26 | End: 2023-02-27 | Stop reason: HOSPADM

## 2023-02-26 RX ORDER — NALBUPHINE HCL 10 MG/ML
5 AMPUL (ML) INJECTION EVERY 4 HOURS PRN
Status: DISCONTINUED | OUTPATIENT
Start: 2023-02-26 | End: 2023-02-27 | Stop reason: HOSPADM

## 2023-02-26 RX ORDER — SODIUM CHLORIDE 9 MG/ML
25 INJECTION, SOLUTION INTRAVENOUS PRN
Status: DISCONTINUED | OUTPATIENT
Start: 2023-02-26 | End: 2023-02-27 | Stop reason: HOSPADM

## 2023-02-26 RX ORDER — SODIUM CHLORIDE 0.9 % (FLUSH) 0.9 %
5-40 SYRINGE (ML) INJECTION PRN
Status: DISCONTINUED | OUTPATIENT
Start: 2023-02-26 | End: 2023-02-27 | Stop reason: HOSPADM

## 2023-02-26 RX ORDER — SODIUM CHLORIDE 9 MG/ML
INJECTION, SOLUTION INTRAVENOUS PRN
Status: DISCONTINUED | OUTPATIENT
Start: 2023-02-26 | End: 2023-02-27 | Stop reason: HOSPADM

## 2023-02-26 RX ORDER — MODIFIED LANOLIN
OINTMENT (GRAM) TOPICAL PRN
Status: DISCONTINUED | OUTPATIENT
Start: 2023-02-26 | End: 2023-02-27 | Stop reason: HOSPADM

## 2023-02-26 RX ORDER — METHYLERGONOVINE MALEATE 0.2 MG/ML
200 INJECTION INTRAVENOUS PRN
Status: DISCONTINUED | OUTPATIENT
Start: 2023-02-26 | End: 2023-02-27 | Stop reason: HOSPADM

## 2023-02-26 RX ORDER — SODIUM CHLORIDE, SODIUM LACTATE, POTASSIUM CHLORIDE, AND CALCIUM CHLORIDE .6; .31; .03; .02 G/100ML; G/100ML; G/100ML; G/100ML
500 INJECTION, SOLUTION INTRAVENOUS PRN
Status: DISCONTINUED | OUTPATIENT
Start: 2023-02-26 | End: 2023-02-27 | Stop reason: HOSPADM

## 2023-02-26 RX ORDER — DOCUSATE SODIUM 100 MG/1
100 CAPSULE, LIQUID FILLED ORAL 2 TIMES DAILY
Status: DISCONTINUED | OUTPATIENT
Start: 2023-02-26 | End: 2023-02-27 | Stop reason: HOSPADM

## 2023-02-26 RX ORDER — IBUPROFEN 600 MG/1
600 TABLET ORAL EVERY 6 HOURS PRN
Status: DISCONTINUED | OUTPATIENT
Start: 2023-02-26 | End: 2023-02-27 | Stop reason: HOSPADM

## 2023-02-26 RX ORDER — SODIUM CHLORIDE, SODIUM LACTATE, POTASSIUM CHLORIDE, AND CALCIUM CHLORIDE .6; .31; .03; .02 G/100ML; G/100ML; G/100ML; G/100ML
1000 INJECTION, SOLUTION INTRAVENOUS PRN
Status: DISCONTINUED | OUTPATIENT
Start: 2023-02-26 | End: 2023-02-27 | Stop reason: HOSPADM

## 2023-02-26 RX ORDER — ONDANSETRON 2 MG/ML
4 INJECTION INTRAMUSCULAR; INTRAVENOUS EVERY 6 HOURS PRN
Status: DISCONTINUED | OUTPATIENT
Start: 2023-02-26 | End: 2023-02-26 | Stop reason: SDUPTHER

## 2023-02-26 RX ORDER — NALOXONE HYDROCHLORIDE 0.4 MG/ML
INJECTION, SOLUTION INTRAMUSCULAR; INTRAVENOUS; SUBCUTANEOUS PRN
Status: DISCONTINUED | OUTPATIENT
Start: 2023-02-26 | End: 2023-02-27 | Stop reason: HOSPADM

## 2023-02-26 RX ORDER — SODIUM CHLORIDE, SODIUM LACTATE, POTASSIUM CHLORIDE, CALCIUM CHLORIDE 600; 310; 30; 20 MG/100ML; MG/100ML; MG/100ML; MG/100ML
INJECTION, SOLUTION INTRAVENOUS CONTINUOUS
Status: DISCONTINUED | OUTPATIENT
Start: 2023-02-26 | End: 2023-02-27 | Stop reason: HOSPADM

## 2023-02-26 RX ORDER — CARBOPROST TROMETHAMINE 250 UG/ML
250 INJECTION, SOLUTION INTRAMUSCULAR PRN
Status: DISCONTINUED | OUTPATIENT
Start: 2023-02-26 | End: 2023-02-27 | Stop reason: HOSPADM

## 2023-02-26 RX ADMIN — ACETAMINOPHEN 650 MG: 325 TABLET ORAL at 20:04

## 2023-02-26 RX ADMIN — Medication 2.5 MILLION UNITS: at 09:04

## 2023-02-26 RX ADMIN — DEXTROSE MONOHYDRATE 5 MILLION UNITS: 5 INJECTION INTRAVENOUS at 04:45

## 2023-02-26 RX ADMIN — DOCUSATE SODIUM 100 MG: 100 CAPSULE, LIQUID FILLED ORAL at 23:26

## 2023-02-26 RX ADMIN — Medication 15 ML: at 10:25

## 2023-02-26 RX ADMIN — WITCH HAZEL: 500 SOLUTION RECTAL; TOPICAL at 23:26

## 2023-02-26 RX ADMIN — IBUPROFEN 600 MG: 600 TABLET, FILM COATED ORAL at 23:29

## 2023-02-26 RX ADMIN — Medication: at 23:26

## 2023-02-26 RX ADMIN — Medication 15 ML/HR: at 10:30

## 2023-02-26 RX ADMIN — Medication 2 MILLI-UNITS/MIN: at 05:00

## 2023-02-26 RX ADMIN — Medication 10 MG: at 10:55

## 2023-02-26 RX ADMIN — Medication 2.5 MILLION UNITS: at 12:53

## 2023-02-26 ASSESSMENT — PAIN DESCRIPTION - FREQUENCY: FREQUENCY: INTERMITTENT

## 2023-02-26 ASSESSMENT — PAIN SCALES - GENERAL
PAINLEVEL_OUTOF10: 6
PAINLEVEL_OUTOF10: 9

## 2023-02-26 ASSESSMENT — LIFESTYLE VARIABLES: SMOKING_STATUS: 1

## 2023-02-26 ASSESSMENT — PAIN - FUNCTIONAL ASSESSMENT
PAIN_FUNCTIONAL_ASSESSMENT: ACTIVITIES ARE NOT PREVENTED
PAIN_FUNCTIONAL_ASSESSMENT: ACTIVITIES ARE NOT PREVENTED

## 2023-02-26 ASSESSMENT — PAIN DESCRIPTION - LOCATION
LOCATION: ABDOMEN
LOCATION: ABDOMEN

## 2023-02-26 ASSESSMENT — PAIN DESCRIPTION - ONSET: ONSET: GRADUAL

## 2023-02-26 ASSESSMENT — PAIN DESCRIPTION - DESCRIPTORS
DESCRIPTORS: CRAMPING
DESCRIPTORS: CRAMPING;DISCOMFORT

## 2023-02-26 ASSESSMENT — PAIN DESCRIPTION - PAIN TYPE: TYPE: ACUTE PAIN

## 2023-02-26 NOTE — PROGRESS NOTES
Parul Anderson CNM notified that patient is 9cm and feeling pressure; en route to hospital for delivery.

## 2023-02-26 NOTE — ANESTHESIA PRE PROCEDURE
Department of Anesthesiology  Preprocedure Note       Name:  Orestes Al   Age:  34 y.o.  :  1995                                          MRN:  [id number]         Date:  2023      Surgeon: * No surgeons listed *    Procedure: * No procedures listed *    Medications prior to admission:   Prior to Admission medications    Medication Sig Start Date End Date Taking? Authorizing Provider   CLARITIN-D 12 HOUR 5-120 MG per extended release tablet Take 1 tablet by mouth 2 times daily For congestion relief as needed.  22   Jarrod Chowdhury MD   albuterol sulfate HFA (PROVENTIL HFA) 108 (90 Base) MCG/ACT inhaler Inhale 2 puffs into the lungs every 4 hours as needed for Shortness of Breath 22   Jarrod Chowdhury MD   ondansetron (ZOFRAN ODT) 4 MG disintegrating tablet Take 1 tablet by mouth every 8 hours as needed for Nausea or Vomiting 21   Jarrod Chowdhury MD   butalbital-APAP-caffeine (FIORICET) -40 MG CAPS per capsule Take 1 capsule by mouth every 4 hours as needed for Headaches 21   Jarrod Chowdhury MD       Current medications:    Current Facility-Administered Medications   Medication Dose Route Frequency Provider Last Rate Last Admin    lactated ringers IV soln infusion   IntraVENous Continuous Lew Tovar MD        lactated ringers bolus  500 mL IntraVENous PRN Lew Tovar MD        Or   Elisha Her lactated ringers bolus  1,000 mL IntraVENous PRN Lew Tovra MD        sodium chloride flush 0.9 % injection 5-40 mL  5-40 mL IntraVENous 2 times per day Lew Tovar MD        sodium chloride flush 0.9 % injection 5-40 mL  5-40 mL IntraVENous PRN Lew Tovar MD        0.9 % sodium chloride infusion  25 mL IntraVENous PRN Lew Tovar MD        oxytocin (PITOCIN) 30 units in 500 mL infusion  1-20 isael-units/min IntraVENous Continuous Lew Tovar MD 10 mL/hr at 23 0810 10 isael-units/min at 23 0810    methylergonovine (METHERGINE) injection 200 mcg  200 mcg IntraMUSCular PRN Patrice Otoole MD        carboprost (HEMABATE) injection 250 mcg  250 mcg IntraMUSCular PRN Patrice Otoole MD        miSOPROStol (CYTOTEC) tablet 800 mcg  800 mcg Rectal PRN Patrice Otoole MD        tranexamic acid (CYKLOKAPRON) 1,000 mg in sodium chloride 0.9 % 100 mL IVPB  1,000 mg IntraVENous Once PRN Patrice Otoole MD        oxytocin (PITOCIN) 30 units in 500 mL infusion  909 isael-units/min IntraVENous PRN Patrice Otoole MD        nalbuphine (NUBAIN) injection 10 mg  10 mg IntraVENous Q2H PRN Patrice Otoole MD        ondansetron [facility]) injection 4 mg  4 mg IntraVENous Q6H PRN Patrice Otoole MD        penicillin G potassium 2.5 million units in 0.9% sodium chloride 100 mL IVPB  2.5 Million Units IntraVENous Q4H Patrice Otoole MD   2.5 Million Units at 02/26/23 0904    fentaNYL 1.85mcg/ml and bupivacaine 0.1% 15ml syringe (Home Care) (OB) 15 mL epidural  15 mL Epidural Once Obinna Yo MD        fentaNYL 1.85mcg/ml and Bupivicaine 0.1% in 0.9% NS 135ml infusion (OB) epidural  15 mL/hr Epidural Continuous Obinna Yo MD        naloxone 0.4 mg in 10 mL sodium chloride syringe   IntraVENous PRN Obinna Yo MD        nalbuphine (NUBAIN) injection 5 mg  5 mg IntraVENous Q4H PRN Obinna Yo MD        ondansetron [facility]) injection 4 mg  4 mg IntraVENous Q6H PRN Obinna Yo MD        ePHEDrine injection 10 mg  10 mg IntraVENous Once Obinna Yo MD           Allergies:  No Known Allergies    Problem List:    Patient Active Problem List   Diagnosis Code    Normal labor O80, Z37.9       Past Medical History:  History reviewed. No pertinent past medical history.     Past Surgical History:        Procedure Laterality Date    TONSILLECTOMY AND ADENOIDECTOMY         Social History:    Social History     Tobacco Use    Smoking status: Every Day     Packs/day: 0.50     Types: Cigarettes Start date: 2003     Last attempt to quit: 2022     Years since quittin.1    Smokeless tobacco: Never   Substance Use Topics    Alcohol use: No                                Ready to quit: Not Answered  Counseling given: Not Answered      Vital Signs (Current):   Vitals:    23 0326 23 0512 23 0542 23 0641   BP: 139/88 129/76 126/83 (!) 126/90   Pulse: (!) 111 86 83 86   Resp: 15      Temp: 36.5 °C (97.7 °F)      TempSrc: Oral      SpO2: 98%                                                 BP Readings from Last 3 Encounters:   23 (!) 126/90   22 (!) 128/94   22 (!) 164/89       NPO Status:                                                                                 BMI:   Wt Readings from Last 3 Encounters:   22 200 lb (90.7 kg)   22 200 lb (90.7 kg)   21 210 lb (95.3 kg)     There is no height or weight on file to calculate BMI.    CBC:   Lab Results   Component Value Date/Time    WBC 11.6 2023 03:55 AM    RBC 3.97 2023 03:55 AM    HGB 11.7 2023 03:55 AM    HCT 37.2 2023 03:55 AM    MCV 93.7 2023 03:55 AM    RDW 12.3 2023 03:55 AM     2023 03:55 AM       CMP:   Lab Results   Component Value Date/Time     2023 03:55 AM    K 4.6 2023 03:55 AM    K 4.6 2022 09:46 PM     2023 03:55 AM    CO2 20 2023 03:55 AM    BUN 11 2023 03:55 AM    CREATININE 0.5 2023 03:55 AM    GFRAA >60 2022 09:46 PM    LABGLOM >60 2023 03:55 AM    GLUCOSE 107 2023 03:55 AM    PROT 6.5 2023 03:55 AM    CALCIUM 8.8 2023 03:55 AM    BILITOT 0.2 2023 03:55 AM    ALKPHOS 269 2023 03:55 AM    AST 30 2023 03:55 AM    ALT 28 2023 03:55 AM       POC Tests: No results for input(s): POCGLU, POCNA, POCK, POCCL, POCBUN, POCHEMO, POCHCT in the last 72 hours.     Coags: No results found for: PROTIME, INR, APTT    HCG (If Applicable): No results found for: PREGTESTUR, PREGSERUM, HCG, HCGQUANT     ABGs: No results found for: PHART, PO2ART, HUV6ZKF, ANN4MBX, BEART, X3QAFQRO     Type & Screen (If Applicable):  No results found for: LABABO, LABRH    Drug/Infectious Status (If Applicable):  No results found for: HIV, HEPCAB    COVID-19 Screening (If Applicable):   Lab Results   Component Value Date/Time    COVID19 Not Detected 03/13/2022 09:46 PM           Anesthesia Evaluation  Patient summary reviewed and Nursing notes reviewed  Airway: Mallampati: II  TM distance: >3 FB   Neck ROM: full  Mouth opening: > = 3 FB   Dental: normal exam         Pulmonary:   (+) current smoker                           Cardiovascular:             Beta Blocker:  Not on Beta Blocker         Neuro/Psych:               GI/Hepatic/Renal:             Endo/Other:                     Abdominal:             Vascular: Other Findings:           Anesthesia Plan      epidural     ASA 2             Anesthetic plan and risks discussed with patient. Plan discussed with attending.                     KARLA Andres - CRNA   2/26/2023

## 2023-02-26 NOTE — PROGRESS NOTES
Hanna Owens in room for procedure at 1005  Patient sitting at side of bed for epidural insertion at 1007  Epidural catheter placed at 1016  Test dose given by Hanna Owens at 1017  Epidural bolus given by Hanna Owens at 1021  Patient returned to semi-marcial's position in bed at 1020  Epidural pump programmed to continuously infuse by Hanna Owens at 1024

## 2023-02-26 NOTE — PROGRESS NOTES
RN to bedside to review POC with pt regarding pitocin and penicillin administration. She verbalizes agreement. FOB supportive at bedside. No further questions at this time.

## 2023-02-26 NOTE — PROGRESS NOTES
Called QUEENIE Duke and notified her of pts arrival, complaint, vitals, reactive EFM, contraction pattern, SVE, and positive amnisure. Orders received to start penicillin due to GBS being unknown and to start pitocin (see MAR).

## 2023-02-26 NOTE — H&P
Department of Obstetrics and Gynecology  [facility]  History and Physical        CHIEF COMPLAINT: SROM     HISTORY OF PRESENT ILLNESS:    The patient is a 34 y.o. female , Patient's last menstrual period was 2022.,  at 40w1d. OB History          2    Para   1    Term   1            AB        Living   1         SAB        IAB        Ectopic        Molar        Multiple        Live Births   1            Patient presents with SROM early AM. Currently complete epidural infusing with desire to push. FOB at bedside. Estimated Due Date: Estimated Date of Delivery: 23    PRENATAL CARE:    Complicated by:   Patient Active Problem List   Diagnosis Code    Normal labor O80, Z37.9       PAST OB HISTORY  OB History          2    Para   1    Term   1            AB        Living   1         SAB        IAB        Ectopic        Molar        Multiple        Live Births   1                Past Medical History:    History reviewed. No pertinent past medical history. Past Surgical History:        Procedure Laterality Date    TONSILLECTOMY AND ADENOIDECTOMY       Social History:    TOBACCO:   reports that she has been smoking cigarettes. She started smoking about 20 years ago. She has been smoking an average of .5 packs per day. She has never used smokeless tobacco.  ETOH:   reports no history of alcohol use. DRUGS:   reports no history of drug use. Family History:   History reviewed. No pertinent family history. Medications Prior to Admission:  Medications Prior to Admission: CLARITIN-D 12 HOUR 5-120 MG per extended release tablet, Take 1 tablet by mouth 2 times daily For congestion relief as needed.   albuterol sulfate HFA (PROVENTIL HFA) 108 (90 Base) MCG/ACT inhaler, Inhale 2 puffs into the lungs every 4 hours as needed for Shortness of Breath  ondansetron (ZOFRAN ODT) 4 MG disintegrating tablet, Take 1 tablet by mouth every 8 hours as needed for Nausea or Vomiting  butalbital-APAP-caffeine (FIORICET) -40 MG CAPS per capsule, Take 1 capsule by mouth every 4 hours as needed for Headaches  Allergies:  Patient has no known allergies. Review of Systems:   Ears, nose, mouth, throat, and face: negative  Respiratory: negative  Cardiovascular: negative  Gastrointestinal: negative  Genitourinary:negative  Integument/breast: negative  Hematologic/lymphatic: negative  Musculoskeletal: contractions   Neurological: negative  Behavioral/Psych: negative  Endocrine: negative  Allergic/Immunologic: negative  Psychosocial: negative    PHYSICAL EXAM:    General appearance:  awake, alert, cooperative, no apparent distress, and appears stated age  Neurologic:  Awake, alert, oriented to name, place and time. Cranial nerves II-XII are grossly intact. and gait is normal.  Lungs:  No increased work of breathing, good air exchange,  Heart:  regular rate and rhythm   Abdomen:  Gravid with normal bowel sounds, soft, non-distended, non-tender, no masses palpated  Pelvis:  External Genitalia: General appearance; normal, Hair distribution; normal, Lesions absent.     Fetal heart rate:  category 2   Cervix:   Complete     Contraction frequency:  2  Membranes:  SROM     /68   Pulse (!) 102   Temp 97.7 °F (36.5 °C) (Oral)   Resp 15   LMP 05/21/2022   SpO2 98%     General Labs:    Lab Results   Component Value Date    WBC 11.6 (H) 02/26/2023    HGB 11.7 02/26/2023    HCT 37.2 02/26/2023    MCV 93.7 02/26/2023     02/26/2023      Lab Results   Component Value Date     02/26/2023    K 4.6 02/26/2023     02/26/2023    CO2 20 (L) 02/26/2023    BUN 11 02/26/2023    CREATININE 0.5 02/26/2023    GLUCOSE 107 (H) 02/26/2023    CALCIUM 8.8 02/26/2023    PROT 6.5 02/26/2023    LABALBU 3.2 (L) 02/26/2023    BILITOT 0.2 02/26/2023    ALKPHOS 269 (H) 02/26/2023    AST 30 02/26/2023    ALT 28 02/26/2023    LABGLOM >60 02/26/2023    GFRAA >60 03/13/2022         Prenatal labs:  - HIV: negative  - HBsAg: negative  - Hepatits C: negative  - RPR: negative  - Chlamydia: negative  - GC: negative  - HSV: not reported  - Rubella: immune  - GBS: positive; mother received adequate intrapartum prophylaxis     ASSESSMENT AND PLAN:  IUP 40   FHR category 2     GBS POS       Delivery Imminent       Electronically signed by KARLA López CNM on 2023 at 3:55 PM

## 2023-02-26 NOTE — PROGRESS NOTES
Pt arrived to unit at this time in wheelchair accompanied by her significant other. , 40.1 weeks gestation with complaint of her water breaking. Oriented to LDR5, urine sample collected. EFM applied. Audible heart tones heard via ultrasound by RN. Maternal perception of fetal movement verified. Pt denies any bleeding. Amnisure is positive.

## 2023-02-26 NOTE — PLAN OF CARE
Problem: Vaginal Birth or  Section  Goal: Fetal and maternal status remain reassuring during the birth process  Description:  Birth OB-Pregnancy care plan goal which identifies if the fetal and maternal status remain reassuring during the birth process  Outcome: Progressing  Flowsheets (Taken 2023)  Fetal and Maternal Status Remain Reassuring During the Birth Process:   Monitor vital signs   Monitor fetal heart rate   Monitor uterine activity   Monitor labor progression (Vaginal delivery)     Problem: Pain  Goal: Verbalizes/displays adequate comfort level or baseline comfort level  Outcome: Progressing  Flowsheets (Taken 2023)  Verbalizes/displays adequate comfort level or baseline comfort level: Assess pain using appropriate pain scale

## 2023-02-26 NOTE — L&D DELIVERY SUMMARY NOTE
Department of Obstetrics and Gynecology  CN Spontaneous Vaginal Birth Note      Pre-operative Diagnosis:  Term pregnancy and Spontaneous labor    Post-operative Diagnosis:  Living  infant(s) and Female    Information for the patient's :  Tra Small [[id number]]                  Infant Wt:   Information for the patient's :  Tra Small [[id number]]           APGARS:     Information for the patient's :  Tra Small [[id number]]           Anesthesia:  epidural anesthesia      Delivery Summary: spontaneous vaginal birth of a viable female infant. The head and shoulders were delivered over an intact perineum in the JESSICA position. Body was easily delivered and the infant was placed to the maternal abdomen where the cord was clamped x2 and cut with maternal permission. The placenta delivered spontaneously  3-vessel cord present . Perineum: first degree perineal  repaired with 3-0 vicryl and small left labial not repaired  hemostatic          Specimen:  placenta     Nuchal Cord: NA      Estimated blood loss: 150              Condition:  infant  and mother stable    Blood Type and Rh: AB POS    . ltd

## 2023-02-26 NOTE — ANESTHESIA PROCEDURE NOTES
Epidural Block    Patient location during procedure: OB  Start time: 2/26/2023 10:07 AM  End time: 2/26/2023 10:33 AM  Reason for block: labor epidural  Staffing  Performed: resident/CRNA   Anesthesiologist: Becki Jarrell MD  Resident/CRNA: KARLA Andres CRNA  Epidural  Patient position: sitting  Prep: ChloraPrep  Patient monitoring: cardiac monitor, continuous pulse ox and frequent blood pressure checks  Approach: midline  Location: L3-4  Injection technique: MARK saline  Provider prep: mask and sterile gloves  Needle  Needle type: Tuohy   Needle gauge: 18 G  Needle length: 3.5 in  Needle insertion depth: 6 cm  Catheter type: end hole  Catheter size: 20 G.   Catheter at skin depth: 12 cm  Test dose: negativeCatheter Secured: tegaderm and tape  Assessment  Hemodynamics: stable  Attempts: 1  Outcomes: uncomplicated and patient tolerated procedure well  Preanesthetic Checklist  Completed: patient identified, IV checked, site marked, risks and benefits discussed, surgical/procedural consents, equipment checked, pre-op evaluation, timeout performed, anesthesia consent given, oxygen available and monitors applied/VS acknowledged

## 2023-02-27 VITALS
DIASTOLIC BLOOD PRESSURE: 86 MMHG | OXYGEN SATURATION: 99 % | SYSTOLIC BLOOD PRESSURE: 151 MMHG | HEART RATE: 94 BPM | TEMPERATURE: 98.2 F | RESPIRATION RATE: 17 BRPM

## 2023-02-27 PROBLEM — Z37.9 NORMAL LABOR: Status: RESOLVED | Noted: 2023-02-26 | Resolved: 2023-02-27

## 2023-02-27 PROCEDURE — 6370000000 HC RX 637 (ALT 250 FOR IP): Performed by: ADVANCED PRACTICE MIDWIFE

## 2023-02-27 PROCEDURE — 2580000003 HC RX 258: Performed by: ADVANCED PRACTICE MIDWIFE

## 2023-02-27 RX ORDER — SODIUM CHLORIDE, SODIUM LACTATE, POTASSIUM CHLORIDE, AND CALCIUM CHLORIDE .6; .31; .03; .02 G/100ML; G/100ML; G/100ML; G/100ML
1000 INJECTION, SOLUTION INTRAVENOUS ONCE
Status: DISCONTINUED | OUTPATIENT
Start: 2023-02-27 | End: 2023-02-27 | Stop reason: HOSPADM

## 2023-02-27 RX ORDER — OXYCODONE HYDROCHLORIDE 5 MG/1
5 TABLET ORAL EVERY 6 HOURS PRN
Status: DISCONTINUED | OUTPATIENT
Start: 2023-02-27 | End: 2023-02-27 | Stop reason: HOSPADM

## 2023-02-27 RX ORDER — IBUPROFEN 600 MG/1
600 TABLET ORAL EVERY 6 HOURS PRN
Qty: 50 TABLET | Refills: 1 | Status: SHIPPED | OUTPATIENT
Start: 2023-02-27

## 2023-02-27 RX ADMIN — ACETAMINOPHEN 650 MG: 325 TABLET ORAL at 11:14

## 2023-02-27 RX ADMIN — OXYCODONE HYDROCHLORIDE 5 MG: 5 TABLET ORAL at 11:14

## 2023-02-27 RX ADMIN — SODIUM CHLORIDE, PRESERVATIVE FREE 10 ML: 5 INJECTION INTRAVENOUS at 07:52

## 2023-02-27 RX ADMIN — IBUPROFEN 600 MG: 600 TABLET, FILM COATED ORAL at 07:51

## 2023-02-27 RX ADMIN — DOCUSATE SODIUM 100 MG: 100 CAPSULE, LIQUID FILLED ORAL at 08:32

## 2023-02-27 ASSESSMENT — PAIN DESCRIPTION - DESCRIPTORS: DESCRIPTORS: ACHING;DISCOMFORT;DULL

## 2023-02-27 ASSESSMENT — PAIN SCALES - GENERAL
PAINLEVEL_OUTOF10: 9
PAINLEVEL_OUTOF10: 4

## 2023-02-27 ASSESSMENT — PAIN - FUNCTIONAL ASSESSMENT: PAIN_FUNCTIONAL_ASSESSMENT: PREVENTS OR INTERFERES SOME ACTIVE ACTIVITIES AND ADLS

## 2023-02-27 ASSESSMENT — PAIN DESCRIPTION - ORIENTATION: ORIENTATION: DISTAL

## 2023-02-27 ASSESSMENT — PAIN DESCRIPTION - LOCATION: LOCATION: BACK

## 2023-02-27 NOTE — PLAN OF CARE
Problem: Pain  Goal: Verbalizes/displays adequate comfort level or baseline comfort level  Outcome: Progressing     Problem: Vaginal Birth or  Section  Goal: Fetal and maternal status remain reassuring during the birth process  Description:  Birth OB-Pregnancy care plan goal which identifies if the fetal and maternal status remain reassuring during the birth process  Outcome: Completed

## 2023-02-27 NOTE — CARE COORDINATION
2023: SS Note:  SS Consult noted regarding \"history of positive UDS\" pt and 's UDS negative at delivery. Met with pt, Brielle Sandy, she was currently holding her  daughter, Saira Krause and was loving and caring toward her baby during sw visit. Explained sw role and consult, she was very pleasant & open to speaking sw, she admits to taking a Percocet after hurting her left at work that was given to her by a friend before knowing she was pregnant, she denies any illicit drug during her pregnancy, any past or current issues of drug abuse or any past involvement with [facility]. She reports having all the necessary supports and provisions to safely take her baby home, she has a 11year old son at home, she list maternal grandmother, Shirley Pelaez and her boyfriend/father of baby, Melissa Miller as her support persons. No parenting concerns reported by nursing staff and no SS concerns for 's release home at discharge.  Electronically signed by JUAN Victor on 2023 at 12:55 PM

## 2023-02-27 NOTE — PROGRESS NOTES
Assuming care of pt at this time for over night shift. RN to bedside. POC for the night discussed and pt verbalizes understanding. Pt is requesting medication for pain. Denies any other needs at this time. FOB supportive at bedside holding . Call light in reach. Instructed to call out with needs.

## 2023-02-27 NOTE — DISCHARGE INSTRUCTIONS
Tub bath in 6 weeks  You may take a shower  Driving 2-3 weeks  Sexual activity 6 weeks  Work/School  6 weeks  Walking  As tolerated  Stairs as tolerated  Lifting no heavy lifting     Please monitor for headache that will not go away, blurry vision, nausea or vomiting.

## 2023-02-27 NOTE — PROGRESS NOTES
Sky Major given update on patient's BP- increased and patent painful. Back pain. Given motrin and heating pad. Orders received for Otilia if needed.

## 2023-02-27 NOTE — PROGRESS NOTES
Dr. Blankenship Jobs on unit- given update on patient's BP readings, pain level and that [facility] ordered pain control.

## 2023-02-27 NOTE — PROGRESS NOTES
Susan Maza notified of Patients BP readings to be sure to discharge patient. Susan Maza Followed up with Dr. Rianna Nolasco- patient OK to D/c.

## 2023-02-27 NOTE — PROGRESS NOTES
[facility] [place] Hearing screening results were discussed with parent. Questions answered. Brochure given to parent. Advised to monitor developmental milestones and contact physician for any concerns.    Amber Starks, AuD

## 2023-02-27 NOTE — DISCHARGE SUMMARY
Obstetric Discharge Summary    Admitting Diagnosis  IUP 40 weeks  OB History          2    Para   2    Term   2            AB        Living   2         SAB        IAB        Ectopic        Molar        Multiple   0    Live Births   2                Reasons for Admission on 2023  3:13 AM  Normal labor [O80, Z37.9]  No comment available  Onset of Labor    Prenatal Procedures  Ultrasound # 3    Intrapartum Procedures        Multiple birth?: No        Spontaneous Vaginal Delivery: See Labor and Delivery Summary       Postpartum Procedures  None    Postpartum/Operative Complications        Data  Information for the patient's :  Rita Portillo Girl Marcela Santiago [[id number]]   female   Birth Weight: 7 lb 4.8 oz (3.31 kg)   Discharge With Mother  Complications: No    Discharge Diagnosis       Discharge Information  Current Discharge Medication List        START taking these medications    Details   ibuprofen (ADVIL;MOTRIN) 600 MG tablet Take 1 tablet by mouth every 6 hours as needed for Pain  Qty: 50 tablet, Refills: 1           CONTINUE these medications which have NOT CHANGED    Details   CLARITIN-D 12 HOUR 5-120 MG per extended release tablet Take 1 tablet by mouth 2 times daily For congestion relief as needed. Qty: 20 tablet, Refills: 0      albuterol sulfate HFA (PROVENTIL HFA) 108 (90 Base) MCG/ACT inhaler Inhale 2 puffs into the lungs every 4 hours as needed for Shortness of Breath  Qty: 18 g, Refills: 0      ondansetron (ZOFRAN ODT) 4 MG disintegrating tablet Take 1 tablet by mouth every 8 hours as needed for Nausea or Vomiting  Qty: 20 tablet, Refills: 0      butalbital-APAP-caffeine (FIORICET) -40 MG CAPS per capsule Take 1 capsule by mouth every 4 hours as needed for Headaches  Qty: 20 capsule, Refills: 0             No discharge procedures on file. Discharged Condition: good    Discharge to: Home  Follow up in 6 weeks at [facility].  Accompanied by self.     Discharge Date: 2/27/2023

## 2023-02-28 NOTE — ANESTHESIA POSTPROCEDURE EVALUATION
Department of Anesthesiology  Postprocedure Note    Patient: Jose Fernandez  MRN: [id number]  YOB: 1995  Date of evaluation: 2/28/2023      Procedure Summary     Date: 02/26/23 Room / Location:     Anesthesia Start: 1007 Anesthesia Stop: 2423    Procedure: Labor Analgesia Diagnosis:     Scheduled Providers:  Responsible Provider: Arianna Cox MD    Anesthesia Type: epidural ASA Status: 2          Anesthesia Type: No value filed.     Kati Phase I:      Kati Phase II:        Anesthesia Post Evaluation    Patient location during evaluation: bedside  Patient participation: complete - patient participated  Level of consciousness: awake  Pain score: 2  Airway patency: patent  Nausea & Vomiting: no nausea  Complications: no  Cardiovascular status: blood pressure returned to baseline  Respiratory status: acceptable  Hydration status: euvolemic

## 2023-03-01 LAB
C TRACH DNA GENITAL QL NAA+PROBE: NEGATIVE
GROUP B STREP CULTURE: NORMAL
N. GONORRHOEAE DNA: NEGATIVE
SOURCE: NORMAL